# Patient Record
(demographics unavailable — no encounter records)

---

## 2024-10-29 NOTE — IMAGING
[de-identified] : RIGHT HIP Good ROM (-) Groin tenderness + Greater troch bursa tenderness (-) Buttock tenderness + Pain with resisted hip abduction Lateral pain with external rotation NVI  Non-antalgic gait w/o assistance

## 2024-10-29 NOTE — DISCUSSION/SUMMARY
[de-identified] : The patient was advised of the diagnosis.  The natural history of the pathology was explained in full to the patient in layman's terms. All questions were answered.  The risks and benefits of surgical and non-surgical treatment alternatives were explained in full to the patient.  The risks, benefits, contents and alternatives to injection were explained in full to the patient.  Risks outlined include but are not limited to infection, sepsis, bleeding, scarring, skin discoloration, temporary increase in pain, syncopal episode, failure to resolve symptoms, allergic reaction, flare reaction, permanent white skin discoloration, symptom recurrence, and elevation of blood sugar in diabetics.  Patient understood the risks.  All questions were answered.  After discussion of options, patient requested an injection.  Oral informed consent was obtained and sterile prep was done of the injection site.  Sterile technique was used to introduce the mixture.  Patient tolerated the procedure well.  Patient advised to ice the injection site this evening.  Signs and symptoms of infection reviewed and patient advised to call immediately for redness, fevers, and/or chills.  The patient's orthopaedic condition(s) would warrant consideration of consistent or intermittent use of a prescription strength non-steroidal anti-inflammatory medication. These medications are associated with risks including but not limited to gastrointestinal irritation, kidney damage, hypertension, and bleeding. The patient has one or more medical conditions that would make this class of medication a significant risk and was therefore not prescribed.  Entered by Jill Castillo acting as a scribe.

## 2024-10-29 NOTE — HISTORY OF PRESENT ILLNESS
[8] : 8 [1] : 2 [Dull/Aching] : dull/aching [Throbbing] : throbbing [de-identified] : 10/29/24: Pt with RT hip abductor tendonitis. No sig change in symptoms since previous visit. Tried arthrotec for about 4 weeks- did provide sig relief in multiple joints but interfered with her beta blocker and caused tachycardic episode. Pt has been beneficial for building strength but still has sig lateral pain. Occ wakes her up at night. Pain with sit to stand and initiating gait. Ambulates without assistance.   Prev doc: 7/2/21: Right lateral hip pain x 3 months - started while walking. No specific injury. No groin pain. No prior hip issues. H/O OA in mult joints, wants to get hip checked. Pain is not severe. Worse with crossing legs and taking off shoes. [place] pain with long periods of sitting. Not severe enough for meds. 7/13/21:F/U MRI. Pain resolved. 8/13/24: 72yo F with right hip pain for the past couple of months after she had been walking while in [place] for 3 weeks. Taking occasional Tylenol with little relief. Most bothersome with stairs.  [de-identified] : PT

## 2024-10-29 NOTE — ASSESSMENT
[FreeTextEntry1] : Previous doc: 7/2/21: Palpable fullness behind greater trochanter that could be inflammation or a small mass- I will get an MRI to evaluate - She will start PT in the mean time - Follow up in 2 weeks 7/13/21: MRI showing Degen labral tear. No pain at this time so will monitor for now. No mass seen. Has incidental finding of fibroid - she is aware and will discuss with her gyn. 8/13/24: Right hip abductor tendonitis. Discussed anti-inflammatories, PT/HEP, and troch CSI. She is well informed and would like to proceed with PT/HEP and Arthrotec at this time. Will avoid Mobic as this has bothered her stomach in the past. f/up 6 weeks   10/29/24: Rt hip abductor tendonitis. Arthrotec interfered with beta-blocker so discontinued this. Discussed treatment options- risks and benefits explained. She is well informed and would like to proceed with troch csi today- sofia well. Will hold off on MRI for now but if has no sig benefit from injection will repeat MRI. New PT Rx given today. F/up 1 month

## 2024-10-29 NOTE — PROCEDURE
[Trigger point 1-2 muscle groups] : trigger point 1-2 muscle groups [Right] : of the right [Greater Trochanteric Bursa] : greater trochanteric bursa [Pain] : pain [Inflammation] : inflammation [Alcohol] : alcohol [Betadine] : betadine [Ethyl Chloride sprayed topically] : ethyl chloride sprayed topically [Sterile technique used] : sterile technique used [___ cc    3mg] :  Betamethasone (Celestone) ~Vcc of 3mg [___ cc    1%] : Lidocaine ~Vcc of 1%  [___ cc    0.25%] : Bupivacaine (Marcaine) ~Vcc of 0.25%  [] : Patient tolerated procedure well [Call if redness, pain or fever occur] : call if redness, pain or fever occur [Apply ice for 15min out of every hour for the next 12-24 hours as tolerated] : apply ice for 15 minutes out of every hour for the next 12-24 hours as tolerated [Previous OTC use and PT nontherapeutic] : patient has tried OTC's including aspirin, Ibuprofen, Aleve, etc or prescription NSAIDS, and/or exercises at home and/or physical therapy without satisfactory response [Risks, benefits, alternatives discussed / Verbal consent obtained] : the risks benefits, and alternatives have been discussed, and verbal consent was obtained [All ultrasound images have been permanently captured and stored accordingly in our picture archiving and communication system] : All ultrasound images have been permanently captured and stored accordingly in our picture archiving and communication system [Visualization of the needle and placement of injection was performed without complication] : visualization of the needle and placement of injection was performed without complication

## 2024-12-03 NOTE — DISCUSSION/SUMMARY
[de-identified] : The patient was advised of the diagnosis.  The natural history of the pathology was explained in full to the patient in layman's terms. All questions were answered.  The risks and benefits of surgical and non-surgical treatment alternatives were explained in full to the patient.  Entered by Jill Castillo acting as a scribe.

## 2024-12-03 NOTE — HISTORY OF PRESENT ILLNESS
[de-identified] : 12/3/24: Pt with RT hip abductor tendonitis. Troch csi at last visit provided some relief and helped her to have more beneficial PT sessions- had to take 10 days off of PT due to Thanksgiving holiday- did a session of PT yesterday and now feels very sore.   Prev doc: 7/2/21: Right lateral hip pain x 3 months - started while walking. No specific injury. No groin pain. No prior hip issues. H/O OA in mult joints, wants to get hip checked. Pain is not severe. Worse with crossing legs and taking off shoes. [place] pain with long periods of sitting. Not severe enough for meds. 7/13/21:F/U MRI. Pain resolved. 8/13/24: 72yo F with right hip pain for the past couple of months after she had been walking while in [place] for 3 weeks. Taking occasional Tylenol with little relief. Most bothersome with stairs.  10/29/24: Pt with RT hip abductor tendonitis. No sig change in symptoms since previous visit. Tried arthrotec for about 4 weeks- did provide sig relief in multiple joints but interfered with her beta blocker and caused tachycardic episode. PT has been beneficial for building strength but still has sig lateral pain. Occ wakes her up at night. Pain with sit to stand and initiating gait. Ambulates without assistance.  [FreeTextEntry5] : slight improvement with inj  [de-identified] : doing pt

## 2024-12-03 NOTE — IMAGING
[de-identified] : RIGHT HIP Good ROM (-) Groin tenderness + Greater troch bursa tenderness (-) Buttock tenderness + Pain with resisted hip abduction Lateral pain with external rotation NVI  Non-antalgic gait w/o assistance

## 2024-12-03 NOTE — ASSESSMENT
[FreeTextEntry1] : Previous doc: 7/2/21: Palpable fullness behind greater trochanter that could be inflammation or a small mass- I will get an MRI to evaluate - She will start PT in the mean time - Follow up in 2 weeks 7/13/21: MRI showing Degen labral tear. No pain at this time so will monitor for now. No mass seen. Has incidental finding of fibroid - she is aware and will discuss with her gyn. 8/13/24: Right hip abductor tendonitis. Discussed anti-inflammatories, PT/HEP, and troch CSI. She is well informed and would like to proceed with PT/HEP and Arthrotec at this time. Will avoid Mobic as this has bothered her stomach in the past. f/up 6 weeks  10/29/24: RT hip abductor tendonitis. Arthrotec interfered with beta-blocker so discontinued this. Discussed treatment options- risks and benefits explained. She is well informed and would like to proceed with troch csi today- sofia well. Will hold off on MRI for now but if has no sig benefit from injection will repeat MRI. New PT Rx given today. F/up 1 month  12/3/24: Pt with RT hip abductor tendonitis. Troch csi at last visit did provide relief but short term. Stable for now. Will transition from PT to HEP as it seems to be aggravating her, she is sore today after PT session yesterday. Advised gradual return to activity, no formal restrictions. F/up as symptoms dictate- has trip coming up this summer- she may come back prior to this for another troch csi